# Patient Record
Sex: FEMALE | Race: WHITE | NOT HISPANIC OR LATINO | Employment: OTHER | URBAN - METROPOLITAN AREA
[De-identification: names, ages, dates, MRNs, and addresses within clinical notes are randomized per-mention and may not be internally consistent; named-entity substitution may affect disease eponyms.]

---

## 2023-07-01 ENCOUNTER — APPOINTMENT (EMERGENCY)
Dept: CT IMAGING | Facility: HOSPITAL | Age: 53
End: 2023-07-01
Payer: COMMERCIAL

## 2023-07-01 ENCOUNTER — HOSPITAL ENCOUNTER (EMERGENCY)
Facility: HOSPITAL | Age: 53
Discharge: HOME | End: 2023-07-01
Attending: EMERGENCY MEDICINE
Payer: COMMERCIAL

## 2023-07-01 VITALS
HEART RATE: 57 BPM | SYSTOLIC BLOOD PRESSURE: 157 MMHG | BODY MASS INDEX: 26.12 KG/M2 | WEIGHT: 153 LBS | TEMPERATURE: 97.6 F | HEIGHT: 64 IN | DIASTOLIC BLOOD PRESSURE: 100 MMHG | RESPIRATION RATE: 18 BRPM | OXYGEN SATURATION: 97 %

## 2023-07-01 DIAGNOSIS — R42 VERTIGO: Primary | ICD-10-CM

## 2023-07-01 DIAGNOSIS — I67.1 RIGHT INTERNAL CAROTID ARTERY ANEURYSM: ICD-10-CM

## 2023-07-01 LAB
ANION GAP SERPL CALCULATED.3IONS-SCNC: 8 MMOL/L
BASOPHILS # BLD AUTO: 0.05 THOUSANDS/ÂΜL (ref 0–0.1)
BASOPHILS NFR BLD AUTO: 1 % (ref 0–1)
BUN SERPL-MCNC: 12 MG/DL (ref 5–25)
CALCIUM SERPL-MCNC: 8.9 MG/DL (ref 8.4–10.2)
CHLORIDE SERPL-SCNC: 102 MMOL/L (ref 96–108)
CO2 SERPL-SCNC: 27 MMOL/L (ref 21–32)
CREAT SERPL-MCNC: 0.67 MG/DL (ref 0.6–1.3)
EOSINOPHIL # BLD AUTO: 0.05 THOUSAND/ÂΜL (ref 0–0.61)
EOSINOPHIL NFR BLD AUTO: 1 % (ref 0–6)
ERYTHROCYTE [DISTWIDTH] IN BLOOD BY AUTOMATED COUNT: 14.1 % (ref 11.6–15.1)
GFR SERPL CREATININE-BSD FRML MDRD: 99 ML/MIN/1.73SQ M
GLUCOSE SERPL-MCNC: 111 MG/DL (ref 65–140)
HCT VFR BLD AUTO: 40.1 % (ref 34.8–46.1)
HGB BLD-MCNC: 13 G/DL (ref 11.5–15.4)
IMM GRANULOCYTES # BLD AUTO: 0.01 THOUSAND/UL (ref 0–0.2)
IMM GRANULOCYTES NFR BLD AUTO: 0 % (ref 0–2)
LYMPHOCYTES # BLD AUTO: 1.63 THOUSANDS/ÂΜL (ref 0.6–4.47)
LYMPHOCYTES NFR BLD AUTO: 20 % (ref 14–44)
MCH RBC QN AUTO: 28.8 PG (ref 26.8–34.3)
MCHC RBC AUTO-ENTMCNC: 32.4 G/DL (ref 31.4–37.4)
MCV RBC AUTO: 89 FL (ref 82–98)
MONOCYTES # BLD AUTO: 0.39 THOUSAND/ÂΜL (ref 0.17–1.22)
MONOCYTES NFR BLD AUTO: 5 % (ref 4–12)
NEUTROPHILS # BLD AUTO: 5.87 THOUSANDS/ÂΜL (ref 1.85–7.62)
NEUTS SEG NFR BLD AUTO: 73 % (ref 43–75)
NRBC BLD AUTO-RTO: 0 /100 WBCS
PLATELET # BLD AUTO: 325 THOUSANDS/UL (ref 149–390)
PMV BLD AUTO: 9.8 FL (ref 8.9–12.7)
POTASSIUM SERPL-SCNC: 3.7 MMOL/L (ref 3.5–5.3)
RBC # BLD AUTO: 4.51 MILLION/UL (ref 3.81–5.12)
SODIUM SERPL-SCNC: 137 MMOL/L (ref 135–147)
WBC # BLD AUTO: 8 THOUSAND/UL (ref 4.31–10.16)

## 2023-07-01 PROCEDURE — 85025 COMPLETE CBC W/AUTO DIFF WBC: CPT | Performed by: EMERGENCY MEDICINE

## 2023-07-01 PROCEDURE — G1004 CDSM NDSC: HCPCS

## 2023-07-01 PROCEDURE — 70496 CT ANGIOGRAPHY HEAD: CPT

## 2023-07-01 PROCEDURE — 36415 COLL VENOUS BLD VENIPUNCTURE: CPT | Performed by: EMERGENCY MEDICINE

## 2023-07-01 PROCEDURE — 99284 EMERGENCY DEPT VISIT MOD MDM: CPT

## 2023-07-01 PROCEDURE — 96374 THER/PROPH/DIAG INJ IV PUSH: CPT

## 2023-07-01 PROCEDURE — 70498 CT ANGIOGRAPHY NECK: CPT

## 2023-07-01 PROCEDURE — 80048 BASIC METABOLIC PNL TOTAL CA: CPT | Performed by: EMERGENCY MEDICINE

## 2023-07-01 RX ORDER — MECLIZINE HCL 12.5 MG/1
25 TABLET ORAL ONCE
Status: COMPLETED | OUTPATIENT
Start: 2023-07-01 | End: 2023-07-01

## 2023-07-01 RX ORDER — ONDANSETRON 4 MG/1
4 TABLET, ORALLY DISINTEGRATING ORAL EVERY 6 HOURS PRN
Qty: 20 TABLET | Refills: 0 | Status: SHIPPED | OUTPATIENT
Start: 2023-07-01

## 2023-07-01 RX ORDER — DIAZEPAM 5 MG/ML
2.5 INJECTION, SOLUTION INTRAMUSCULAR; INTRAVENOUS ONCE
Status: COMPLETED | OUTPATIENT
Start: 2023-07-01 | End: 2023-07-01

## 2023-07-01 RX ORDER — PREDNISONE 20 MG/1
40 TABLET ORAL DAILY
Qty: 8 TABLET | Refills: 0 | Status: SHIPPED | OUTPATIENT
Start: 2023-07-02 | End: 2023-07-01

## 2023-07-01 RX ORDER — MECLIZINE HYDROCHLORIDE 25 MG/1
25 TABLET ORAL 3 TIMES DAILY PRN
Qty: 30 TABLET | Refills: 0 | Status: SHIPPED | OUTPATIENT
Start: 2023-07-01

## 2023-07-01 RX ADMIN — MECLIZINE 25 MG: 12.5 TABLET ORAL at 12:31

## 2023-07-01 RX ADMIN — IOHEXOL 85 ML: 350 INJECTION, SOLUTION INTRAVENOUS at 12:55

## 2023-07-01 RX ADMIN — DIAZEPAM 2.5 MG: 5 INJECTION INTRAMUSCULAR; INTRAVENOUS at 13:23

## 2023-07-01 NOTE — ED PROVIDER NOTES
History  Chief Complaint   Patient presents with   • Dizziness     Pt reports nausea and dizziness since 0400. Pts daughter reports labile pressures. Pt not currently on BP meds     Patient is a 58-year-old female with remote history of breast cancer, hypothyroidism that presents for dizziness. Patient says since 4 AM she has been having some room spinning dizziness. She says that it is only when she is standing or when she abruptly moves her head. Blind in the bed, she has [place] no dizziness this time. She also has some nausea without vomiting secondary to the dizziness. She is never had symptoms like this before in the past.  She denies headache, recent head strike, altered mental status or focal neurologic deficit. She denies chest pain dyspnea abdominal pain associate with her symptoms. None       Past Medical History:   Diagnosis Date   • Breast CA ([address])    • Hypothyroidism        History reviewed. No pertinent surgical history. History reviewed. No pertinent family history. I have reviewed and agree with the history as documented. E-Cigarette/Vaping     E-Cigarette/Vaping Substances     Social History     Tobacco Use   • Smoking status: Never   • Smokeless tobacco: Never   Substance Use Topics   • Alcohol use: Never   • Drug use: Never       Review of Systems   Constitutional: Negative for fever. HENT: Negative for sore throat. Respiratory: Negative for shortness of breath. Cardiovascular: Negative for chest pain. Gastrointestinal: Positive for nausea. Negative for abdominal pain. Genitourinary: Negative for dysuria. Musculoskeletal: Negative for back pain. Skin: Negative for rash. Neurological: Positive for dizziness. Negative for light-headedness. Psychiatric/Behavioral: Negative for agitation. All other systems reviewed and are negative. Physical Exam  Physical Exam  Vitals reviewed. Constitutional:       General: She is not in acute distress. Appearance: She is well-developed. HENT:      Head: Normocephalic. Eyes:      Pupils: Pupils are equal, round, and reactive to light. Comments: No significant nystagmus bilaterally. Test of skew reassuring   Cardiovascular:      Rate and Rhythm: Normal rate and regular rhythm. Heart sounds: Normal heart sounds. Pulmonary:      Effort: Pulmonary effort is normal.      Breath sounds: Normal breath sounds. Abdominal:      General: Bowel sounds are normal. There is no distension. Palpations: Abdomen is soft. Tenderness: There is no abdominal tenderness. There is no guarding. Musculoskeletal:         General: No tenderness or deformity. Normal range of motion. Cervical back: Normal range of motion and neck supple. Skin:     General: Skin is warm and dry. Capillary Refill: Capillary refill takes less than 2 seconds. Neurological:      Mental Status: She is alert and oriented to person, place, and time. Cranial Nerves: No cranial nerve deficit. Sensory: No sensory deficit. Comments: Alert oriented x3. GCS 15. Cranial nerves 2-12 intact. Strength 5/5 in all 4 extremities. Sensation intact throughout. Finger-nose normal bilaterally. Non-ataxic gait. Psychiatric:         Behavior: Behavior normal.         Thought Content:  Thought content normal.         Judgment: Judgment normal.         Vital Signs  ED Triage Vitals   Temperature Pulse Respirations Blood Pressure SpO2   07/01/23 1137 07/01/23 1131 07/01/23 1131 07/01/23 1131 07/01/23 1131   97.6 °F (36.4 °C) 60 16 (!) 182/96 97 %      Temp Source Heart Rate Source Patient Position - Orthostatic VS BP Location FiO2 (%)   07/01/23 1137 07/01/23 1131 07/01/23 1131 07/01/23 1131 --   Oral Monitor Lying Left arm       Pain Score       07/01/23 1131       No Pain           Vitals:    07/01/23 1200 07/01/23 1230 07/01/23 1330 07/01/23 1400   BP: 154/96 (!) 172/102 (!) 156/102 157/100   Pulse: (!) 54 59 60 57 Patient Position - Orthostatic VS:             Visual Acuity  Visual Acuity    Flowsheet Row Most Recent Value   L Pupil Size (mm) 5   R Pupil Size (mm) 4          ED Medications  Medications   meclizine (ANTIVERT) tablet 25 mg (25 mg Oral Given 7/1/23 1231)   iohexol (OMNIPAQUE) 350 MG/ML injection (SINGLE-DOSE) 85 mL (85 mL Intravenous Given 7/1/23 1255)   diazepam (VALIUM) injection 2.5 mg (2.5 mg Intravenous Given 7/1/23 1323)       Diagnostic Studies  Results Reviewed     Procedure Component Value Units Date/Time    Basic metabolic panel [[id number]] Collected: 07/01/23 1211    Lab Status: Final result Specimen: Blood from Arm, Left Updated: 07/01/23 1233     Sodium 137 mmol/L      Potassium 3.7 mmol/L      Chloride 102 mmol/L      CO2 27 mmol/L      ANION GAP 8 mmol/L      BUN 12 mg/dL      Creatinine 0.67 mg/dL      Glucose 111 mg/dL      Calcium 8.9 mg/dL      eGFR 99 ml/min/1.73sq m     Narrative:      [place] guidelines for Chronic Kidney Disease (CKD):   •  Stage 1 with normal or high GFR (GFR > 90 mL/min/1.73 square meters)  •  Stage 2 Mild CKD (GFR = 60-89 mL/min/1.73 square meters)  •  Stage 3A Moderate CKD (GFR = 45-59 mL/min/1.73 square meters)  •  Stage 3B Moderate CKD (GFR = 30-44 mL/min/1.73 square meters)  •  Stage 4 Severe CKD (GFR = 15-29 mL/min/1.73 square meters)  •  Stage 5 End Stage CKD (GFR <15 mL/min/1.73 square meters)  Note: GFR calculation is accurate only with a steady state creatinine    CBC and differential [[id number]] Collected: 07/01/23 1211    Lab Status: Final result Specimen: Blood from Arm, Left Updated: 07/01/23 1223     WBC 8.00 Thousand/uL      RBC 4.51 Million/uL      Hemoglobin 13.0 g/dL      Hematocrit 40.1 %      MCV 89 fL      MCH 28.8 pg      MCHC 32.4 g/dL      RDW 14.1 %      MPV 9.8 fL      Platelets 894 Thousands/uL      nRBC 0 /100 WBCs      Neutrophils Relative 73 %      Immat GRANS % 0 %      Lymphocytes Relative 20 %      Monocytes Relative 5 %      Eosinophils Relative 1 %      Basophils Relative 1 %      Neutrophils Absolute 5.87 Thousands/µL      Immature Grans Absolute 0.01 Thousand/uL      Lymphocytes Absolute 1.63 Thousands/µL      Monocytes Absolute 0.39 Thousand/µL      Eosinophils Absolute 0.05 Thousand/µL      Basophils Absolute 0.05 Thousands/µL                  CTA head and neck with and without contrast   Final Result by YADIRA Hernandez MD (07/01 1404)      No acute intracranial pathology. No significant stenosis of the cervical carotid or vertebral arteries. No significant intracranial stenosis or large vessel occlusion. 4 mm right paraophthalmic ICA aneurysm. Follow-up with neurovascular service. The study was marked in [facility] for immediate notification. Workstation performed: [id number]                    Procedures  Procedures         ED Course                               SBIRT 22yo+    Flowsheet Row Most Recent Value   Initial Alcohol Screen: US AUDIT-C     1. How often do you have a drink containing alcohol? 0 Filed at: 07/01/2023 1136   2. How many drinks containing alcohol do you have on a typical day you are drinking? 0 Filed at: 07/01/2023 1136   3a. Male UNDER 65: How often do you have five or more drinks on one occasion? 0 Filed at: 07/01/2023 1136   3b. FEMALE Any Age, or MALE 65+: How often do you have 4 or more drinks on one occassion? 0 Filed at: 07/01/2023 1136   Audit-C Score 0 Filed at: 07/01/2023 1136   SERGEY: How many times in the past year have you. .. Used an illegal drug or used a prescription medication for non-medical reasons? Never Filed at: 07/01/2023 1136                    Medical Decision Making  Patient is a 23-year-old female who presents for evaluation of room spinning dizziness. Patient's vertigo seems to be secondary to peripheral etiology, she also notes some tenderness last night.   The fact that it is intermittent and positional in nature seem to indicate that it is peripheral.  However, CT head and neck were obtained and found to be negative. She was found to have a right ICA aneurysm which is incidental, discussed with endovascular and plan for outpatient follow-up. Blood work also reviewed and negative. Complete resolution of symptoms after meclizine and small dose of Valium. Strict return precautions discussed and outpatient management agreed upon. Patient's daughter helps with history. Amount and/or Complexity of Data Reviewed  Labs: ordered. Radiology: ordered. Risk  Prescription drug management. Disposition  Final diagnoses:   Vertigo   Right internal carotid artery aneurysm     Time reflects when diagnosis was documented in both MDM as applicable and the Disposition within this note     Time User Action Codes Description Comment    7/1/2023 12:16 PM Ree Janie Add [R21] Rash     7/1/2023 12:24 PM Shugars, Daniel Schaumann Remove [R21] Rash     7/1/2023  2:30 PM Yvetta Real, Daniel Schaumann Add [R42] Vertigo     7/1/2023  2:31 PM Yvetta Real, Daniel Schaumann Add [I67.1] Right internal carotid artery aneurysm       ED Disposition     ED Disposition   Discharge    Condition   Stable    Date/Time   Sat Jul 1, 2023  2:30 PM    [facility] [address] discharge to home/self care.                Follow-up Information     Follow up With Specialties Details Why Contact Info Additional [address] Emergency Department Emergency Medicine  If symptoms worsen [address] [place] [address]  [address] [place] [address] Emergency Department, [facility], [address]    John Tinajero MD Neurosurgery Schedule an appointment as soon as possible for a visit   [address] [place] [address] [address] [place] [address] [phone]             Discharge Medication List as of 7/1/2023  2:33 PM      START taking these medications    Details   meclizine (ANTIVERT) 25 mg tablet Take 1 tablet (25 mg total) by mouth 3 (three) times a day as needed for dizziness, Starting Sat 7/1/2023, Normal      ondansetron (Zofran ODT) 4 mg disintegrating tablet Take 1 tablet (4 mg total) by mouth every 6 (six) hours as needed for nausea or vomiting, Starting Sat 7/1/2023, Normal                 PDMP Review     None          ED Provider  Electronically Signed by           Spencer Armenta MD  07/01/23 9628

## 2023-07-01 NOTE — DISCHARGE INSTRUCTIONS
-As discussed, please use the meclizine for dizziness and Zofran for nausea over the next several days. Follow-up with ENT for further work-up of peripheral vertigo and follow-up with neurosurgery regarding incidentally found ICA aneurysm.

## 2023-09-08 PROBLEM — I67.1 INTRACRANIAL ANEURYSM: Status: ACTIVE | Noted: 2023-09-08

## 2023-09-08 NOTE — ASSESSMENT & PLAN NOTE
New evaluation of a 4mm right paraophthalmic artery aneurysm   · Noted on work up for vertigo/tinnitus 7/2023. This has resolved. · Mother passed away from 'bleeding in her head' while in the [place] in 2005. Non-smoker. BP controlled. · Exam: non-focal     Imaging:  · CTA head/neck 7/1/23: No acute intracranial pathology. No significant stenosis of the cervical carotid or vertebral arteries. No significant intracranial stenosis or large vessel occlusion. 4 mm right paraophthalmic ICA aneurysm. Follow-up with neurovascular service    Plan:  Reviewed images with patient, daughter and Dr. Vidhya Martins. 4mm right paraophthalmic artery aneurysm seen. She does have a concerning family history of possible aneurysm rupture in her mother who passed at a young age. While her risk of rupture is still low, it is likely slightly higher due to her family history. Recommend diagnostic cerebral angiogram for further evaluation of her aneurysm. Risks/benefits discussed by Dr. Vidhya Martins. Discussed natural history of aneurysms and risk of rupture. Discussed genetic component to aneurysms as well as modifiable risk factors such as smoking and HTN. Discussed screening of her siblings as well as all of their children with MRA head. If negative, can be re-screened every 5 years. Reviewed red flag s/s. She would like to discuss the procedure with her family and will call us back in the next several days to set this up. Call sooner with any questions or concerns.

## 2023-09-11 ENCOUNTER — OFFICE VISIT (OUTPATIENT)
Dept: NEUROSURGERY | Facility: CLINIC | Age: 53
End: 2023-09-11
Payer: COMMERCIAL

## 2023-09-11 VITALS
HEART RATE: 66 BPM | RESPIRATION RATE: 18 BRPM | HEIGHT: 64 IN | SYSTOLIC BLOOD PRESSURE: 128 MMHG | WEIGHT: 155.8 LBS | DIASTOLIC BLOOD PRESSURE: 68 MMHG | BODY MASS INDEX: 26.6 KG/M2 | TEMPERATURE: 99.4 F | OXYGEN SATURATION: 98 %

## 2023-09-11 DIAGNOSIS — I67.1 RIGHT INTERNAL CAROTID ARTERY ANEURYSM: ICD-10-CM

## 2023-09-11 PROCEDURE — 99244 OFF/OP CNSLTJ NEW/EST MOD 40: CPT | Performed by: RADIOLOGY

## 2023-09-11 RX ORDER — LEVOTHYROXINE SODIUM 88 UG/1
TABLET ORAL
COMMUNITY
Start: 2023-09-01

## 2023-09-11 RX ORDER — LETROZOLE 2.5 MG/1
2.5 TABLET, FILM COATED ORAL DAILY
COMMUNITY
Start: 2023-06-20 | End: 2024-06-19

## 2023-09-11 NOTE — PROGRESS NOTES
Neurosurgery Office Note  Nereida Hernandez 46 y.o. female MRN: [id number]      Assessment/Plan     Intracranial aneurysm  New evaluation of a 4mm right paraophthalmic artery aneurysm   · Noted on work up for vertigo/tinnitus 7/2023. This has resolved. · Mother passed away from 'bleeding in her head' while in the [place] in 2005. Non-smoker. BP controlled. · Exam: non-focal     Imaging:  · CTA head/neck 7/1/23: No acute intracranial pathology. No significant stenosis of the cervical carotid or vertebral arteries. No significant intracranial stenosis or large vessel occlusion. 4 mm right paraophthalmic ICA aneurysm. Follow-up with neurovascular service    Plan:  Reviewed images with patient, daughter and Dr. Tejas Pinzon. 4mm right paraophthalmic artery aneurysm seen. She does have a concerning family history of possible aneurysm rupture in her mother who passed at a young age. While her risk of rupture is still low, it is likely slightly higher due to her family history. Recommend diagnostic cerebral angiogram for further evaluation of her aneurysm. Risks/benefits discussed by Dr. Tejas Pinzon. Discussed natural history of aneurysms and risk of rupture. Discussed genetic component to aneurysms as well as modifiable risk factors such as smoking and HTN. Discussed screening of her siblings as well as all of their children with MRA head. If negative, can be re-screened every 5 years. Reviewed red flag s/s. She would like to discuss the procedure with her family and will call us back in the next several days to set this up. Call sooner with any questions or concerns. Diagnoses and all orders for this visit:    Right internal carotid artery aneurysm  -     Ambulatory Referral to Neurosurgery    Other orders  -     levothyroxine 88 mcg tablet  -     letrozole (FEMARA) 2.5 mg tablet;  Take 2.5 mg by mouth daily          I have spent a total time of 40 minutes on 09/11/23 in caring for this patient including Diagnostic results, Risks and benefits of tx options, Instructions for management, Patient and family education, Risk factor reductions, Impressions, Counseling / Coordination of care, Documenting in the medical record, Reviewing / ordering tests, medicine, procedures  , Obtaining or reviewing history   and Communicating with other healthcare professionals . CHIEF COMPLAINT    Chief Complaint   Patient presents with   • Follow-up       HISTORY    History of Present Illness     46y.o. year old female     48-year-old female seen for new evaluation of a 4 mm right paraophthalmic artery aneurysm incidentally noted on evaluation of vertigo 7/2023. She developed an acute episode of vertigo and was unable to stand on her own. She also had associated tinnitus bilaterally, numbness in her hands, nausea. Her symptoms have resolved except for bilateral ear tinnitus, right worse than left. Due to see ENT later this month. Notes occasional frontal headache, no blurry or double vision, though daughter who is with her states that she had a single episode of blurry vision in both eyes that was self-limiting. No difficulty speaking/trouble getting words out, numbness or weakness in her extremities, no gait or balance issues, BBI. Patient states that her mother passed away from bleeding in her brain and a stroke at a young age in 2005 they were still living in the [address] [place] [address] what was really the cause. Patient is a non-smoker, BP controlled. See Discussion    REVIEW OF SYSTEMS    Review of Systems   Constitutional: Negative. HENT: Positive for tinnitus (right ear wooshing sound has appointment with ent). Eyes: Positive for visual disturbance (at times with headache ). Respiratory: Negative. Cardiovascular: Negative. Gastrointestinal: Negative. Endocrine: Negative. Genitourinary: Negative. Musculoskeletal: Negative. Skin: Negative. Allergic/Immunologic: Negative. Neurological: Positive for headaches (at times). Negative for dizziness, seizures, speech difficulty, weakness and numbness. Hematological: Does not bruise/bleed easily. Psychiatric/Behavioral: Negative for sleep disturbance. ROS obtained by MA. Reviewed. See HPI. Meds/Allergies     Current Outpatient Medications   Medication Sig Dispense Refill   • letrozole [facility]) 2.5 mg tablet Take 2.5 mg by mouth daily     • levothyroxine 88 mcg tablet      • meclizine (ANTIVERT) 25 mg tablet Take 1 tablet (25 mg total) by mouth 3 (three) times a day as needed for dizziness (Patient not taking: Reported on 9/11/2023) 30 tablet 0   • ondansetron (Zofran ODT) 4 mg disintegrating tablet Take 1 tablet (4 mg total) by mouth every 6 (six) hours as needed for nausea or vomiting (Patient not taking: Reported on 9/11/2023) 20 tablet 0     No current facility-administered medications for this visit. No Known Allergies    PAST HISTORY    Past Medical History:   Diagnosis Date   • Breast CA ([address])    • Hypothyroidism    • Intracranial aneurysm 9/8/2023       History reviewed. No pertinent surgical history. Social History     Tobacco Use   • Smoking status: Never   • Smokeless tobacco: Never   Substance Use Topics   • Alcohol use: Never   • Drug use: Never       History reviewed. No pertinent family history. Above history personally reviewed. EXAM    Vitals:Blood pressure 128/68, pulse 66, temperature 99.4 °F (37.4 °C), temperature source Temporal, resp. rate 18, height 5' 4" (1.626 m), weight 70.7 kg (155 lb 12.8 oz), SpO2 98 %. ,Body mass index is 26.74 kg/m². Physical Exam  Vitals reviewed. Constitutional:       General: She is awake. Appearance: Normal appearance. HENT:      Head: Normocephalic and atraumatic. Eyes:      Extraocular Movements: EOM normal.      Conjunctiva/sclera: Conjunctivae normal.      Pupils: Pupils are equal, round, and reactive to light.    Cardiovascular:      Rate and Rhythm: Normal rate. Pulmonary:      Effort: Pulmonary effort is normal.   Skin:     General: Skin is warm and dry. Neurological:      Mental Status: She is alert and oriented to person, place, and time. Motor: Motor strength is normal.     Coordination: Finger-Nose-Finger Test normal.      Gait: Gait is intact. Deep Tendon Reflexes:      Reflex Scores:       Bicep reflexes are 2+ on the right side and 2+ on the left side. Brachioradialis reflexes are 2+ on the right side and 2+ on the left side. Patellar reflexes are 2+ on the right side and 2+ on the left side. Achilles reflexes are 2+ on the right side and 2+ on the left side. Psychiatric:         Attention and Perception: Attention and perception normal.         Mood and Affect: Mood and affect normal.         Speech: Speech normal.         Behavior: Behavior normal. Behavior is cooperative. Thought Content: Thought content normal.         Cognition and Memory: Cognition and memory normal.         Judgment: Judgment normal.         Neurologic Exam     Mental Status   Oriented to person, place, and time. Follows 2 step commands. Attention: normal. Concentration: normal.   Speech: speech is normal   Level of consciousness: alert  Knowledge: good. Able to perform simple calculations. Able to name object. Normal comprehension. Cranial Nerves     CN III, IV, VI   Pupils are equal, round, and reactive to light. Extraocular motions are normal.   Right pupil: Shape: regular. Reactivity: brisk. Consensual response: intact. Left pupil: Shape: regular. Reactivity: brisk. Consensual response: intact. CN III: no CN III palsy  CN VI: no CN VI palsy  Nystagmus: none   Ophthalmoparesis: none  Upgaze: normal  Downgaze: normal  Conjugate gaze: present    CN V   Facial sensation intact. CN VII   Facial expression full, symmetric.      CN VIII   Hearing: intact    CN XI   Right trapezius strength: normal  Left trapezius strength: normal    CN XII   CN XII normal.     Motor Exam   Muscle bulk: normal  Overall muscle tone: normal  Right arm pronator drift: absent  Left arm pronator drift: absent    Strength   Strength 5/5 throughout. Sensory Exam   Light touch normal.     Gait, Coordination, and Reflexes     Gait  Gait: normal    Coordination   Finger to nose coordination: normal    Tremor   Resting tremor: absent  Intention tremor: absent  Action tremor: absent    Reflexes   Right brachioradialis: 2+  Left brachioradialis: 2+  Right biceps: 2+  Left biceps: 2+  Right patellar: 2+  Left patellar: 2+  Right achilles: 2+  Left achilles: 2+  Right Christianson: absent  Left Christianson: absent  Right ankle clonus: absent  Left ankle clonus: absent        MEDICAL DECISION MAKING    Imaging Studies:     No results found. I have personally reviewed pertinent reports.    and I have personally reviewed pertinent films in PACS

## 2024-06-03 ENCOUNTER — APPOINTMENT (OUTPATIENT)
Dept: LAB | Facility: CLINIC | Age: 54
End: 2024-06-03
Payer: COMMERCIAL

## 2024-06-03 DIAGNOSIS — E03.8 TOXIC DIFFUSE GOITER WITH PRETIBIAL MYXEDEMA: ICD-10-CM

## 2024-06-03 DIAGNOSIS — E78.00 PURE HYPERCHOLESTEROLEMIA: ICD-10-CM

## 2024-06-03 DIAGNOSIS — E55.9 AVITAMINOSIS D: ICD-10-CM

## 2024-06-03 DIAGNOSIS — E53.8 BIOTIN-(PROPIONYL-COA-CARBOXYLASE) LIGASE DEFICIENCY: ICD-10-CM

## 2024-06-03 DIAGNOSIS — R73.03 DIABETES MELLITUS, LATENT: ICD-10-CM

## 2024-06-03 DIAGNOSIS — Z11.59 SCREENING EXAMINATION FOR POLIOMYELITIS: ICD-10-CM

## 2024-06-03 DIAGNOSIS — E05.00 TOXIC DIFFUSE GOITER WITH PRETIBIAL MYXEDEMA: ICD-10-CM

## 2024-06-03 DIAGNOSIS — E06.3 CHRONIC LYMPHOCYTIC THYROIDITIS: ICD-10-CM

## 2024-06-03 LAB
25(OH)D3 SERPL-MCNC: 19.1 NG/ML (ref 30–100)
ALBUMIN SERPL BCP-MCNC: 4.5 G/DL (ref 3.5–5)
ALP SERPL-CCNC: 60 U/L (ref 34–104)
ALT SERPL W P-5'-P-CCNC: 11 U/L (ref 7–52)
ANION GAP SERPL CALCULATED.3IONS-SCNC: 10 MMOL/L (ref 4–13)
AST SERPL W P-5'-P-CCNC: 19 U/L (ref 13–39)
BILIRUB SERPL-MCNC: 0.48 MG/DL (ref 0.2–1)
BUN SERPL-MCNC: 16 MG/DL (ref 5–25)
CALCIUM SERPL-MCNC: 9.3 MG/DL (ref 8.4–10.2)
CHLORIDE SERPL-SCNC: 103 MMOL/L (ref 96–108)
CHOLEST SERPL-MCNC: 201 MG/DL
CO2 SERPL-SCNC: 26 MMOL/L (ref 21–32)
CREAT SERPL-MCNC: 0.72 MG/DL (ref 0.6–1.3)
EST. AVERAGE GLUCOSE BLD GHB EST-MCNC: 117 MG/DL
GFR SERPL CREATININE-BSD FRML MDRD: 94 ML/MIN/1.73SQ M
GLUCOSE P FAST SERPL-MCNC: 83 MG/DL (ref 65–99)
HBA1C MFR BLD: 5.7 %
HDLC SERPL-MCNC: 57 MG/DL
LDLC SERPL CALC-MCNC: 123 MG/DL (ref 0–100)
NONHDLC SERPL-MCNC: 144 MG/DL
POTASSIUM SERPL-SCNC: 4.1 MMOL/L (ref 3.5–5.3)
PROT SERPL-MCNC: 8.1 G/DL (ref 6.4–8.4)
SODIUM SERPL-SCNC: 139 MMOL/L (ref 135–147)
TRIGL SERPL-MCNC: 105 MG/DL
TSH SERPL DL<=0.05 MIU/L-ACNC: 24.93 UIU/ML (ref 0.45–4.5)
VIT B12 SERPL-MCNC: 142 PG/ML (ref 180–914)

## 2024-06-03 PROCEDURE — 83036 HEMOGLOBIN GLYCOSYLATED A1C: CPT

## 2024-06-03 PROCEDURE — 87340 HEPATITIS B SURFACE AG IA: CPT

## 2024-06-03 PROCEDURE — 80061 LIPID PANEL: CPT

## 2024-06-03 PROCEDURE — 36415 COLL VENOUS BLD VENIPUNCTURE: CPT

## 2024-06-03 PROCEDURE — 82607 VITAMIN B-12: CPT

## 2024-06-03 PROCEDURE — 86704 HEP B CORE ANTIBODY TOTAL: CPT

## 2024-06-03 PROCEDURE — 82306 VITAMIN D 25 HYDROXY: CPT

## 2024-06-03 PROCEDURE — 84443 ASSAY THYROID STIM HORMONE: CPT

## 2024-06-03 PROCEDURE — 80053 COMPREHEN METABOLIC PANEL: CPT

## 2024-06-03 PROCEDURE — 86706 HEP B SURFACE ANTIBODY: CPT

## 2024-06-04 LAB
HBV CORE AB SER QL: REACTIVE
HBV SURFACE AB SER-ACNC: 79.3 MIU/ML
HBV SURFACE AG SER QL: NORMAL

## 2024-07-03 ENCOUNTER — OFFICE VISIT (OUTPATIENT)
Dept: INTERNAL MEDICINE CLINIC | Facility: CLINIC | Age: 54
End: 2024-07-03
Payer: COMMERCIAL

## 2024-07-03 VITALS
HEIGHT: 64 IN | DIASTOLIC BLOOD PRESSURE: 70 MMHG | SYSTOLIC BLOOD PRESSURE: 122 MMHG | HEART RATE: 68 BPM | TEMPERATURE: 98.6 F | OXYGEN SATURATION: 98 % | WEIGHT: 162.4 LBS | BODY MASS INDEX: 27.72 KG/M2

## 2024-07-03 DIAGNOSIS — R42 VERTIGO: ICD-10-CM

## 2024-07-03 DIAGNOSIS — Z13.220 SCREENING FOR HYPERCHOLESTEROLEMIA: ICD-10-CM

## 2024-07-03 DIAGNOSIS — I10 PRIMARY HYPERTENSION: ICD-10-CM

## 2024-07-03 DIAGNOSIS — C50.912 MALIGNANT NEOPLASM OF LEFT FEMALE BREAST, UNSPECIFIED ESTROGEN RECEPTOR STATUS, UNSPECIFIED SITE OF BREAST (HCC): ICD-10-CM

## 2024-07-03 DIAGNOSIS — Z79.811 AROMATASE INHIBITOR USE: ICD-10-CM

## 2024-07-03 DIAGNOSIS — Z12.4 SCREENING FOR CERVICAL CANCER: ICD-10-CM

## 2024-07-03 DIAGNOSIS — Z11.59 NEED FOR HEPATITIS C SCREENING TEST: ICD-10-CM

## 2024-07-03 DIAGNOSIS — Z13.0 SCREENING, ANEMIA, DEFICIENCY, IRON: ICD-10-CM

## 2024-07-03 DIAGNOSIS — Z13.1 SCREENING FOR DIABETES MELLITUS: ICD-10-CM

## 2024-07-03 DIAGNOSIS — H81.10 BENIGN PAROXYSMAL POSITIONAL VERTIGO, UNSPECIFIED LATERALITY: ICD-10-CM

## 2024-07-03 DIAGNOSIS — E03.8 HYPOTHYROIDISM DUE TO HASHIMOTO'S THYROIDITIS: ICD-10-CM

## 2024-07-03 DIAGNOSIS — Z12.11 SCREENING FOR COLON CANCER: ICD-10-CM

## 2024-07-03 DIAGNOSIS — Z13.820 SCREENING FOR OSTEOPOROSIS: ICD-10-CM

## 2024-07-03 DIAGNOSIS — Z12.31 ENCOUNTER FOR SCREENING MAMMOGRAM FOR BREAST CANCER: ICD-10-CM

## 2024-07-03 DIAGNOSIS — Z11.4 SCREENING FOR HIV (HUMAN IMMUNODEFICIENCY VIRUS): ICD-10-CM

## 2024-07-03 DIAGNOSIS — E06.3 HYPOTHYROIDISM DUE TO HASHIMOTO'S THYROIDITIS: ICD-10-CM

## 2024-07-03 DIAGNOSIS — Z23 ENCOUNTER FOR IMMUNIZATION: Primary | ICD-10-CM

## 2024-07-03 PROBLEM — K59.04 CHRONIC IDIOPATHIC CONSTIPATION: Status: ACTIVE | Noted: 2017-05-23

## 2024-07-03 PROBLEM — I67.1 ANEURYSM OF RIGHT INTERNAL CAROTID ARTERY: Status: ACTIVE | Noted: 2023-07-26

## 2024-07-03 PROBLEM — R73.03 PREDIABETES: Status: ACTIVE | Noted: 2022-04-29

## 2024-07-03 PROBLEM — E78.00 ELEVATED LOW DENSITY LIPOPROTEIN (LDL) CHOLESTEROL LEVEL: Status: ACTIVE | Noted: 2022-08-01

## 2024-07-03 PROBLEM — E55.9 VITAMIN D DEFICIENCY: Status: ACTIVE | Noted: 2024-05-29

## 2024-07-03 PROCEDURE — 99205 OFFICE O/P NEW HI 60 MIN: CPT | Performed by: INTERNAL MEDICINE

## 2024-07-03 RX ORDER — LEVOTHYROXINE SODIUM 0.1 MG/1
100 TABLET ORAL DAILY
Qty: 30 TABLET | Refills: 1 | Status: SHIPPED | OUTPATIENT
Start: 2024-07-03 | End: 2024-09-01

## 2024-07-03 RX ORDER — MECLIZINE HYDROCHLORIDE 25 MG/1
25 TABLET ORAL 3 TIMES DAILY PRN
Qty: 30 TABLET | Refills: 0 | Status: SHIPPED | OUTPATIENT
Start: 2024-07-03

## 2024-07-03 RX ORDER — LEVOTHYROXINE SODIUM 0.1 MG/1
100 TABLET ORAL DAILY
COMMUNITY
Start: 2024-06-11 | End: 2024-07-03 | Stop reason: SDUPTHER

## 2024-07-03 RX ORDER — LETROZOLE 2.5 MG/1
2.5 TABLET, FILM COATED ORAL DAILY
Qty: 30 TABLET | Refills: 11 | Status: SHIPPED | OUTPATIENT
Start: 2024-07-03 | End: 2025-07-03

## 2024-07-03 NOTE — PROGRESS NOTES
Ambulatory Visit  Name: Lizeth Hartman      : 1970      MRN: [id number]  Encounter Provider: Danilo Robles DO  Encounter Date: 7/3/2024   Encounter department: [facility], located within [facility]    Assessment & Plan   1. Encounter for immunization  2. Hypothyroidism due to Hashimoto's thyroiditis  Assessment & Plan:  TSH very elevated and we will restart Synthroid (Brand Only) and see if she tolerates this   Declined Amour thyroid.    TSH in 6 weeks to discuss  Orders:  -     levothyroxine 100 mcg tablet; Take 1 tablet (100 mcg total) by mouth daily (Brand only)  -     TSH, 3rd generation; Future; Expected date: 2024  3. Benign paroxysmal positional vertigo, unspecified laterality  Assessment & Plan:  Meclizine PRN  Continue to follow  No symptoms at this time  4. Aromatase inhibitor use  Assessment & Plan:  DXA scan ordered  5. Primary hypertension  Assessment & Plan:  BP is noted to be good and no change at this time  No medications for BP and we will continue to follow  6. Malignant neoplasm of left female breast, unspecified estrogen receptor status, unspecified site of breast (HCC)  -     Ambulatory Referral to Obstetrics / Gynecology; Future  -     letrozole (FEMARA) 2.5 mg tablet; Take 1 tablet (2.5 mg total) by mouth daily  7. Encounter for screening mammogram for breast cancer  -     Mammo screening bilateral w 3d & cad; Future; Expected date: 2024  8. Need for hepatitis C screening test  9. Screening for HIV (human immunodeficiency virus)  10. Screening for cervical cancer  11. Screening for colon cancer  12. Vertigo  -     meclizine (ANTIVERT) 25 mg tablet; Take 1 tablet (25 mg total) by mouth 3 (three) times a day as needed for dizziness  13. Screening for osteoporosis  -     DXA bone density spine hip and pelvis; Future; Expected date: 2024  14. Screening for hypercholesterolemia  -     Lipid Panel with Direct LDL reflex; Future; Expected date: 2024  15. Screening for  "diabetes mellitus  -     Comprehensive metabolic panel; Future; Expected date: 08/14/2024  16. Screening, anemia, deficiency, iron  -     CBC and differential; Future; Expected date: 08/14/2024       History of Present Illness     The patient was seen and examined and noted to have issues with an elevated TSH.  The patient notes that the levothyroxine caused issues with myalgia ( I noted that hypothyroidism caused similar issues).  She stopped the Levothyroxine and it was noted to be 54.  We discussed the elevated nature of the her TSH.  The patient is on Femara and has not had a DXA.  We discussed and she is amendable to this.  In addition, the patient notes that she would like a referral to a local Gyn.  The patient notes remote issues with vertigo.  She has Meclizine and that worked.  She notes the right TM is perforated.  The patient notes no other issues at this time.        Review of Systems   Constitutional:  Negative for chills, fatigue and fever.   HENT: Negative.     Respiratory:  Negative for cough, chest tightness and shortness of breath.    Cardiovascular:  Negative for chest pain, palpitations and leg swelling.   Gastrointestinal:  Negative for abdominal pain, constipation, diarrhea, nausea and vomiting.   Genitourinary: Negative.    Musculoskeletal:  Negative for arthralgias, back pain and myalgias.   Skin: Negative.    Neurological: Negative.    Psychiatric/Behavioral: Negative.         Objective     /70 (BP Location: Left arm, Patient Position: Sitting, Cuff Size: Adult)   Pulse 68   Temp 98.6 °F (37 °C) (Tympanic)   Ht 5' 4\" (1.626 m)   Wt 73.7 kg (162 lb 6.4 oz)   SpO2 98%   BMI 27.88 kg/m²     Physical Exam  Vitals and nursing note reviewed.   Constitutional:       General: She is not in acute distress.     Appearance: She is well-developed.   HENT:      Head: Normocephalic and atraumatic.      Ears:      Comments: Right TM is perforated  Eyes:      Conjunctiva/sclera: Conjunctivae " normal.   Cardiovascular:      Rate and Rhythm: Normal rate and regular rhythm.      Heart sounds: No murmur heard.  Pulmonary:      Effort: Pulmonary effort is normal. No respiratory distress.      Breath sounds: Normal breath sounds.   Abdominal:      Palpations: Abdomen is soft.      Tenderness: There is no abdominal tenderness.   Musculoskeletal:         General: No swelling.      Cervical back: Neck supple.   Skin:     General: Skin is warm and dry.      Capillary Refill: Capillary refill takes less than 2 seconds.   Neurological:      Mental Status: She is alert.   Psychiatric:         Mood and Affect: Mood normal.       Administrative Statements

## 2024-07-03 NOTE — ASSESSMENT & PLAN NOTE
TSH very elevated and we will restart Synthroid (Brand Only) and see if she tolerates this   Declined Amour thyroid.    TSH in 6 weeks to discuss

## 2024-07-03 NOTE — ASSESSMENT & PLAN NOTE
BP is noted to be good and no change at this time  No medications for BP and we will continue to follow

## 2024-07-12 ENCOUNTER — TELEPHONE (OUTPATIENT)
Age: 54
End: 2024-07-12

## 2024-07-17 ENCOUNTER — TELEPHONE (OUTPATIENT)
Age: 54
End: 2024-07-17

## 2024-07-17 NOTE — TELEPHONE ENCOUNTER
PA for letrozole (FEMARA) 2.5 mg tablet      Submitted via    []CMM-KEY   [x]The [employer]-Case ID #   []Faxed to plan   []Other website   []Phone call Case ID #     Office notes sent, clinical questions answered. Awaiting determination    Turnaround time for your insurance to make a decision on your Prior Authorization can take 7-21 business days.

## 2024-07-18 NOTE — TELEPHONE ENCOUNTER
PA for letrozole (FEMARA) 2.5 mg tablet Approved     Date(s) approved 7/17/2024-7/17/2025    Case #    Patient advised by          [x] [employer] Message  [] Phone call   []LMOM  []L/M to call office as no active Communication consent on file  []Unable to leave detailed message as VM not approved on Communication consent       Pharmacy advised by    [x]Fax  []Phone call    Approval letter scanned into Media Yes

## 2024-07-20 DIAGNOSIS — E03.8 HYPOTHYROIDISM DUE TO HASHIMOTO'S THYROIDITIS: ICD-10-CM

## 2024-07-20 DIAGNOSIS — E06.3 HYPOTHYROIDISM DUE TO HASHIMOTO'S THYROIDITIS: ICD-10-CM

## 2024-07-20 RX ORDER — LEVOTHYROXINE SODIUM 0.1 MG/1
100 TABLET ORAL DAILY
Qty: 30 TABLET | Refills: 0 | Status: CANCELLED | OUTPATIENT
Start: 2024-07-20 | End: 2024-09-18

## 2024-07-22 ENCOUNTER — HOSPITAL ENCOUNTER (OUTPATIENT)
Dept: BONE DENSITY | Facility: HOSPITAL | Age: 54
Discharge: HOME | End: 2024-07-22
Payer: COMMERCIAL

## 2024-07-22 VITALS — BODY MASS INDEX: 27.66 KG/M2 | WEIGHT: 162 LBS | HEIGHT: 64 IN

## 2024-07-22 DIAGNOSIS — Z13.820 SCREENING FOR OSTEOPOROSIS: ICD-10-CM

## 2024-07-22 PROCEDURE — 77080 DXA BONE DENSITY AXIAL: CPT

## 2024-07-25 PROBLEM — M85.851 OSTEOPENIA OF BOTH THIGHS: Status: ACTIVE | Noted: 2024-07-25

## 2024-07-25 PROBLEM — M85.852 OSTEOPENIA OF BOTH THIGHS: Status: ACTIVE | Noted: 2024-07-25

## 2024-07-25 NOTE — RESULT ENCOUNTER NOTE
Osteopenia noted on the DXA scan.  You should engage in regular weight bearing exercise and take Calcium 600 mg with Vitamin D 200-400 IU twice a day.  We will repeat the study in 2 years

## 2024-07-31 ENCOUNTER — HOSPITAL ENCOUNTER (OUTPATIENT)
Dept: MAMMOGRAPHY | Facility: HOSPITAL | Age: 54
Discharge: HOME | End: 2024-07-31
Payer: COMMERCIAL

## 2024-07-31 DIAGNOSIS — Z12.31 ENCOUNTER FOR SCREENING MAMMOGRAM FOR BREAST CANCER: ICD-10-CM

## 2024-07-31 PROCEDURE — 77067 SCR MAMMO BI INCL CAD: CPT

## 2024-07-31 PROCEDURE — 77063 BREAST TOMOSYNTHESIS BI: CPT

## 2024-08-01 ENCOUNTER — TELEPHONE (OUTPATIENT)
Dept: INTERNAL MEDICINE CLINIC | Facility: CLINIC | Age: 54
End: 2024-08-01

## 2024-08-01 NOTE — TELEPHONE ENCOUNTER
----- Message from Danilo Robles DO sent at 8/1/2024  2:46 PM EDT -----  Mammogram was noted to be negative and follow up next year with a repeat screening mammogram   DISCHARGE

## 2024-08-15 ENCOUNTER — LAB (OUTPATIENT)
Dept: LAB | Facility: CLINIC | Age: 54
End: 2024-08-15
Payer: COMMERCIAL

## 2024-08-15 ENCOUNTER — TELEPHONE (OUTPATIENT)
Dept: INTERNAL MEDICINE CLINIC | Facility: CLINIC | Age: 54
End: 2024-08-15

## 2024-08-15 DIAGNOSIS — Z13.220 SCREENING FOR HYPERCHOLESTEROLEMIA: ICD-10-CM

## 2024-08-15 DIAGNOSIS — E03.8 HYPOTHYROIDISM DUE TO HASHIMOTO'S THYROIDITIS: ICD-10-CM

## 2024-08-15 DIAGNOSIS — Z13.0 SCREENING, ANEMIA, DEFICIENCY, IRON: ICD-10-CM

## 2024-08-15 DIAGNOSIS — Z13.1 SCREENING FOR DIABETES MELLITUS: ICD-10-CM

## 2024-08-15 DIAGNOSIS — E06.3 HYPOTHYROIDISM DUE TO HASHIMOTO'S THYROIDITIS: ICD-10-CM

## 2024-08-15 LAB
ALBUMIN SERPL BCG-MCNC: 4.2 G/DL (ref 3.5–5)
ALP SERPL-CCNC: 64 U/L (ref 34–104)
ALT SERPL W P-5'-P-CCNC: 14 U/L (ref 7–52)
ANION GAP SERPL CALCULATED.3IONS-SCNC: 8 MMOL/L (ref 4–13)
AST SERPL W P-5'-P-CCNC: 16 U/L (ref 13–39)
BASOPHILS # BLD AUTO: 0.07 THOUSANDS/ÂΜL (ref 0–0.1)
BASOPHILS NFR BLD AUTO: 1 % (ref 0–1)
BILIRUB SERPL-MCNC: 0.59 MG/DL (ref 0.2–1)
BUN SERPL-MCNC: 13 MG/DL (ref 5–25)
CALCIUM SERPL-MCNC: 9.3 MG/DL (ref 8.4–10.2)
CHLORIDE SERPL-SCNC: 103 MMOL/L (ref 96–108)
CHOLEST SERPL-MCNC: 191 MG/DL
CO2 SERPL-SCNC: 27 MMOL/L (ref 21–32)
CREAT SERPL-MCNC: 0.66 MG/DL (ref 0.6–1.3)
EOSINOPHIL # BLD AUTO: 0.11 THOUSAND/ÂΜL (ref 0–0.61)
EOSINOPHIL NFR BLD AUTO: 2 % (ref 0–6)
ERYTHROCYTE [DISTWIDTH] IN BLOOD BY AUTOMATED COUNT: 13.7 % (ref 11.6–15.1)
GFR SERPL CREATININE-BSD FRML MDRD: 99 ML/MIN/1.73SQ M
GLUCOSE P FAST SERPL-MCNC: 86 MG/DL (ref 65–99)
HCT VFR BLD AUTO: 40.2 % (ref 34.8–46.1)
HDLC SERPL-MCNC: 46 MG/DL
HGB BLD-MCNC: 13.1 G/DL (ref 11.5–15.4)
IMM GRANULOCYTES # BLD AUTO: 0.02 THOUSAND/UL (ref 0–0.2)
IMM GRANULOCYTES NFR BLD AUTO: 0 % (ref 0–2)
LDLC SERPL CALC-MCNC: 120 MG/DL (ref 0–100)
LYMPHOCYTES # BLD AUTO: 2.46 THOUSANDS/ÂΜL (ref 0.6–4.47)
LYMPHOCYTES NFR BLD AUTO: 43 % (ref 14–44)
MCH RBC QN AUTO: 28.4 PG (ref 26.8–34.3)
MCHC RBC AUTO-ENTMCNC: 32.6 G/DL (ref 31.4–37.4)
MCV RBC AUTO: 87 FL (ref 82–98)
MONOCYTES # BLD AUTO: 0.64 THOUSAND/ÂΜL (ref 0.17–1.22)
MONOCYTES NFR BLD AUTO: 11 % (ref 4–12)
NEUTROPHILS # BLD AUTO: 2.49 THOUSANDS/ÂΜL (ref 1.85–7.62)
NEUTS SEG NFR BLD AUTO: 43 % (ref 43–75)
NRBC BLD AUTO-RTO: 0 /100 WBCS
PLATELET # BLD AUTO: 361 THOUSANDS/UL (ref 149–390)
PMV BLD AUTO: 9.6 FL (ref 8.9–12.7)
POTASSIUM SERPL-SCNC: 4 MMOL/L (ref 3.5–5.3)
PROT SERPL-MCNC: 8 G/DL (ref 6.4–8.4)
RBC # BLD AUTO: 4.61 MILLION/UL (ref 3.81–5.12)
SODIUM SERPL-SCNC: 138 MMOL/L (ref 135–147)
TRIGL SERPL-MCNC: 127 MG/DL
TSH SERPL DL<=0.05 MIU/L-ACNC: 0.09 UIU/ML (ref 0.45–4.5)
WBC # BLD AUTO: 5.79 THOUSAND/UL (ref 4.31–10.16)

## 2024-08-15 PROCEDURE — 36415 COLL VENOUS BLD VENIPUNCTURE: CPT

## 2024-08-15 PROCEDURE — 80061 LIPID PANEL: CPT

## 2024-08-15 PROCEDURE — 80053 COMPREHEN METABOLIC PANEL: CPT

## 2024-08-15 PROCEDURE — 85025 COMPLETE CBC W/AUTO DIFF WBC: CPT

## 2024-08-15 PROCEDURE — 84443 ASSAY THYROID STIM HORMONE: CPT

## 2024-08-15 NOTE — TELEPHONE ENCOUNTER
----- Message from Danilo Robles DO sent at 8/15/2024  2:51 PM EDT -----  TSH is very suppressed and I would like to follow up and discuss this further

## 2024-10-18 ENCOUNTER — TELEPHONE (OUTPATIENT)
Age: 54
End: 2024-10-18

## 2024-10-18 NOTE — TELEPHONE ENCOUNTER
Patient's daughter states dentist will not pull tooth because her BP was a little low. They faxed a clearance form over to be reviewed by provider. Not in chart. Confirmed fax number and she will have them re-fax.

## 2024-10-22 RX ORDER — CYCLOSPORINE 0 G/ML
SOLUTION/ DROPS OPHTHALMIC; TOPICAL
COMMUNITY
Start: 2024-09-25

## 2024-10-22 RX ORDER — PERFLUOROHEXYLOCTANE 1 MG/MG
SOLUTION OPHTHALMIC
COMMUNITY
Start: 2024-09-06

## 2024-10-22 RX ORDER — AMOXICILLIN 500 MG/1
TABLET, FILM COATED ORAL
COMMUNITY
Start: 2024-10-08

## 2024-10-22 RX ORDER — TRIAMCINOLONE ACETONIDE 1 MG/G
1 CREAM TOPICAL 2 TIMES DAILY
COMMUNITY
Start: 2024-02-11 | End: 2025-02-10

## 2024-10-22 RX ORDER — IBUPROFEN 600 MG/1
600 TABLET, FILM COATED ORAL EVERY 6 HOURS PRN
COMMUNITY
Start: 2024-10-08

## 2024-10-22 NOTE — TELEPHONE ENCOUNTER
Patient's daughter called to follow up on forms. Daughter stated the forms were faxed 2 weeks ago.     I spoke with Gelnna in office. Glenna stated when Dr. Robles make a decision on wether an apt is needed or forms can be filled out that office will return a call.     Daughter expressed understanding. Daughter stated patient is in a great deal of pain.     Please advise  Thank you

## 2024-10-26 DIAGNOSIS — E06.3 HYPOTHYROIDISM DUE TO HASHIMOTO'S THYROIDITIS: ICD-10-CM

## 2024-10-27 RX ORDER — LEVOTHYROXINE SODIUM 100 MCG
TABLET ORAL
Qty: 30 TABLET | Refills: 5 | Status: SHIPPED | OUTPATIENT
Start: 2024-10-27

## 2025-01-03 ENCOUNTER — OFFICE VISIT (OUTPATIENT)
Dept: INTERNAL MEDICINE CLINIC | Facility: CLINIC | Age: 55
End: 2025-01-03
Payer: COMMERCIAL

## 2025-01-03 VITALS
BODY MASS INDEX: 27.66 KG/M2 | WEIGHT: 162 LBS | HEIGHT: 64 IN | SYSTOLIC BLOOD PRESSURE: 128 MMHG | TEMPERATURE: 97.6 F | DIASTOLIC BLOOD PRESSURE: 74 MMHG | OXYGEN SATURATION: 97 % | HEART RATE: 71 BPM

## 2025-01-03 DIAGNOSIS — E55.9 VITAMIN D DEFICIENCY: ICD-10-CM

## 2025-01-03 DIAGNOSIS — E53.8 B12 DEFICIENCY: ICD-10-CM

## 2025-01-03 DIAGNOSIS — Z23 ENCOUNTER FOR IMMUNIZATION: Primary | ICD-10-CM

## 2025-01-03 DIAGNOSIS — Z12.4 SCREENING FOR CERVICAL CANCER: ICD-10-CM

## 2025-01-03 DIAGNOSIS — I67.1 INTRACRANIAL ANEURYSM: ICD-10-CM

## 2025-01-03 DIAGNOSIS — Z12.11 ENCOUNTER FOR COLORECTAL CANCER SCREENING: ICD-10-CM

## 2025-01-03 DIAGNOSIS — Z12.12 ENCOUNTER FOR COLORECTAL CANCER SCREENING: ICD-10-CM

## 2025-01-03 DIAGNOSIS — E06.3 HYPOTHYROIDISM DUE TO HASHIMOTO'S THYROIDITIS: ICD-10-CM

## 2025-01-03 PROCEDURE — 99203 OFFICE O/P NEW LOW 30 MIN: CPT | Performed by: INTERNAL MEDICINE

## 2025-01-09 ENCOUNTER — APPOINTMENT (OUTPATIENT)
Dept: LAB | Facility: CLINIC | Age: 55
End: 2025-01-09
Payer: COMMERCIAL

## 2025-01-09 ENCOUNTER — RESULTS FOLLOW-UP (OUTPATIENT)
Dept: INTERNAL MEDICINE CLINIC | Facility: CLINIC | Age: 55
End: 2025-01-09

## 2025-01-09 DIAGNOSIS — E53.8 B12 DEFICIENCY: ICD-10-CM

## 2025-01-09 DIAGNOSIS — E55.9 VITAMIN D DEFICIENCY: ICD-10-CM

## 2025-01-09 DIAGNOSIS — E06.3 HYPOTHYROIDISM DUE TO HASHIMOTO'S THYROIDITIS: ICD-10-CM

## 2025-01-09 LAB
25(OH)D3 SERPL-MCNC: 10.7 NG/ML (ref 30–100)
TSH SERPL DL<=0.05 MIU/L-ACNC: 0.14 UIU/ML (ref 0.45–4.5)
VIT B12 SERPL-MCNC: 154 PG/ML (ref 180–914)

## 2025-01-09 PROCEDURE — 84443 ASSAY THYROID STIM HORMONE: CPT

## 2025-01-09 PROCEDURE — 82607 VITAMIN B-12: CPT

## 2025-01-09 PROCEDURE — 36415 COLL VENOUS BLD VENIPUNCTURE: CPT

## 2025-01-09 PROCEDURE — 82306 VITAMIN D 25 HYDROXY: CPT

## 2025-01-09 NOTE — RESULT ENCOUNTER NOTE
TSH is noted to be mildly suppressed and I would like to decrease the dose of Levothyroxine to 88 mcg a month and follow up with a repeat TSH in 6 weeks?  B12 noted to be decreased and I would like to start B12 shots.  Is this okay?

## 2025-01-10 DIAGNOSIS — E06.3 HYPOTHYROIDISM DUE TO HASHIMOTO'S THYROIDITIS: ICD-10-CM

## 2025-01-10 DIAGNOSIS — E53.8 B12 DEFICIENCY: Primary | ICD-10-CM

## 2025-01-10 DIAGNOSIS — R42 VERTIGO: ICD-10-CM

## 2025-01-10 RX ORDER — LEVOTHYROXINE SODIUM 88 MCG
88 TABLET ORAL DAILY
Qty: 30 TABLET | Refills: 5 | Status: SHIPPED | OUTPATIENT
Start: 2025-01-10 | End: 2025-01-10 | Stop reason: SDUPTHER

## 2025-01-10 RX ORDER — CYANOCOBALAMIN 1000 UG/ML
1000 INJECTION, SOLUTION INTRAMUSCULAR; SUBCUTANEOUS
Status: SHIPPED | OUTPATIENT
Start: 2025-01-17 | End: 2025-02-14

## 2025-01-10 NOTE — TELEPHONE ENCOUNTER
----- Message from Danilo Robles, DO sent at 1/9/2025  5:22 PM EST -----  TSH is noted to be mildly suppressed and I would like to decrease the dose of Levothyroxine to 88 mcg a month and follow up with a repeat TSH in 6 weeks?  B12 noted to be decreased and I would like to start B12 shots.  Is this okay?

## 2025-01-13 RX ORDER — LEVOTHYROXINE SODIUM 88 MCG
88 TABLET ORAL DAILY
Qty: 30 TABLET | Refills: 5 | Status: SHIPPED | OUTPATIENT
Start: 2025-01-13 | End: 2025-07-12

## 2025-01-13 RX ORDER — MECLIZINE HYDROCHLORIDE 25 MG/1
25 TABLET ORAL 3 TIMES DAILY PRN
Qty: 30 TABLET | Refills: 1 | Status: SHIPPED | OUTPATIENT
Start: 2025-01-13

## 2025-01-14 ENCOUNTER — CLINICAL SUPPORT (OUTPATIENT)
Dept: INTERNAL MEDICINE CLINIC | Facility: CLINIC | Age: 55
End: 2025-01-14
Payer: COMMERCIAL

## 2025-01-14 DIAGNOSIS — E53.8 B12 DEFICIENCY: Primary | ICD-10-CM

## 2025-01-14 DIAGNOSIS — C50.912 MALIGNANT NEOPLASM OF LEFT FEMALE BREAST, UNSPECIFIED ESTROGEN RECEPTOR STATUS, UNSPECIFIED SITE OF BREAST (HCC): Primary | ICD-10-CM

## 2025-01-14 PROCEDURE — NURSE

## 2025-01-14 PROCEDURE — 96372 THER/PROPH/DIAG INJ SC/IM: CPT | Performed by: INTERNAL MEDICINE

## 2025-01-14 RX ORDER — PERFLUOROHEXYLOCTANE 1 MG/MG
SOLUTION OPHTHALMIC
Refills: 0 | OUTPATIENT
Start: 2025-01-14

## 2025-01-14 RX ORDER — LETROZOLE 2.5 MG/1
2.5 TABLET, FILM COATED ORAL DAILY
Qty: 90 TABLET | Refills: 1 | Status: SHIPPED | COMMUNITY
Start: 2025-01-14 | End: 2025-07-13

## 2025-01-14 RX ADMIN — CYANOCOBALAMIN 1000 MCG: 1000 INJECTION, SOLUTION INTRAMUSCULAR; SUBCUTANEOUS at 09:34

## 2025-01-16 NOTE — PROGRESS NOTES
"Name: Lizeth Hartman      : 1970      MRN: [id number]  Encounter Provider: Danilo Robles DO  Encounter Date: 1/3/2025   Encounter department: [facility]  :  Assessment & Plan  Hypothyroidism due to Hashimoto's thyroiditis  TSH ordered   Continue the Levothyroxine 88 mcg QDay  Orders:  •  TSH, 3rd generation; Future    Intracranial aneurysm  Family history of cerebral aneurysm  Orders:  •  CTA head and neck w wo contrast; Future    Vitamin D deficiency  Follow up on vitamin D  Orders:  •  Vitamin D 25 hydroxy; Future    B12 deficiency  Follow up on B12  Orders:  •  Vitamin B12; Future    Screening for cervical cancer         Encounter for immunization         Encounter for colorectal cancer screening                History of Present Illness     No concerns at this time      Review of Systems   Constitutional:  Negative for chills and fever.   HENT:  Negative for ear pain and sore throat.    Eyes:  Negative for pain and visual disturbance.   Respiratory:  Negative for cough and shortness of breath.    Cardiovascular:  Negative for chest pain and palpitations.   Gastrointestinal:  Negative for abdominal pain and vomiting.   Genitourinary:  Negative for dysuria and hematuria.   Musculoskeletal:  Negative for arthralgias and back pain.   Skin:  Negative for color change and rash.   Neurological:  Negative for seizures and syncope.   All other systems reviewed and are negative.      Objective   /74   Pulse 71   Temp 97.6 °F (36.4 °C) (Tympanic)   Ht 5' 4\" (1.626 m)   Wt 73.5 kg (162 lb)   SpO2 97%   BMI 27.81 kg/m²      Physical Exam  Vitals and nursing note reviewed.   Constitutional:       General: She is not in acute distress.     Appearance: She is well-developed.   HENT:      Head: Normocephalic and atraumatic.   Eyes:      Conjunctiva/sclera: Conjunctivae normal.   Cardiovascular:      Rate and Rhythm: Normal rate and regular rhythm.      Heart sounds: No murmur " heard.  Pulmonary:      Effort: Pulmonary effort is normal. No respiratory distress.      Breath sounds: Normal breath sounds.   Abdominal:      Palpations: Abdomen is soft.      Tenderness: There is no abdominal tenderness.   Musculoskeletal:         General: No swelling.      Cervical back: Neck supple.   Skin:     General: Skin is warm and dry.      Capillary Refill: Capillary refill takes less than 2 seconds.   Neurological:      Mental Status: She is alert.   Psychiatric:         Mood and Affect: Mood normal.

## 2025-01-21 ENCOUNTER — CLINICAL SUPPORT (OUTPATIENT)
Dept: INTERNAL MEDICINE CLINIC | Facility: CLINIC | Age: 55
End: 2025-01-21
Payer: COMMERCIAL

## 2025-01-21 DIAGNOSIS — E53.8 B12 DEFICIENCY: Primary | ICD-10-CM

## 2025-01-21 PROCEDURE — NURSE

## 2025-01-21 PROCEDURE — 96372 THER/PROPH/DIAG INJ SC/IM: CPT

## 2025-01-21 RX ADMIN — CYANOCOBALAMIN 1000 MCG: 1000 INJECTION, SOLUTION INTRAMUSCULAR; SUBCUTANEOUS at 08:35

## 2025-01-28 ENCOUNTER — CLINICAL SUPPORT (OUTPATIENT)
Dept: INTERNAL MEDICINE CLINIC | Facility: CLINIC | Age: 55
End: 2025-01-28
Payer: COMMERCIAL

## 2025-01-28 DIAGNOSIS — E53.8 VITAMIN B 12 DEFICIENCY: Primary | ICD-10-CM

## 2025-01-28 PROCEDURE — NURSE

## 2025-01-28 PROCEDURE — 96372 THER/PROPH/DIAG INJ SC/IM: CPT

## 2025-01-28 RX ADMIN — CYANOCOBALAMIN 1000 MCG: 1000 INJECTION, SOLUTION INTRAMUSCULAR; SUBCUTANEOUS at 10:12

## 2025-02-04 ENCOUNTER — CLINICAL SUPPORT (OUTPATIENT)
Dept: INTERNAL MEDICINE CLINIC | Facility: CLINIC | Age: 55
End: 2025-02-04
Payer: COMMERCIAL

## 2025-02-04 DIAGNOSIS — E53.8 VITAMIN B 12 DEFICIENCY: Primary | ICD-10-CM

## 2025-02-04 PROCEDURE — 96372 THER/PROPH/DIAG INJ SC/IM: CPT

## 2025-02-04 RX ADMIN — CYANOCOBALAMIN 1000 MCG: 1000 INJECTION, SOLUTION INTRAMUSCULAR; SUBCUTANEOUS at 11:19

## 2025-04-24 ENCOUNTER — RESULTS FOLLOW-UP (OUTPATIENT)
Dept: INTERNAL MEDICINE CLINIC | Facility: CLINIC | Age: 55
End: 2025-04-24

## 2025-04-24 ENCOUNTER — APPOINTMENT (OUTPATIENT)
Dept: LAB | Facility: CLINIC | Age: 55
End: 2025-04-24
Payer: COMMERCIAL

## 2025-04-24 DIAGNOSIS — E06.3 HYPOTHYROIDISM DUE TO HASHIMOTO'S THYROIDITIS: ICD-10-CM

## 2025-04-24 LAB — TSH SERPL DL<=0.05 MIU/L-ACNC: 1.72 UIU/ML (ref 0.45–4.5)

## 2025-04-24 PROCEDURE — 36415 COLL VENOUS BLD VENIPUNCTURE: CPT

## 2025-04-24 PROCEDURE — 84443 ASSAY THYROID STIM HORMONE: CPT

## 2025-04-24 NOTE — TELEPHONE ENCOUNTER
----- Message from Danilo Robles DO sent at 4/24/2025 12:11 PM EDT -----  TSH is noted to be good and continue the current dose of Levothryoxine

## 2025-04-27 DIAGNOSIS — E06.3 HYPOTHYROIDISM DUE TO HASHIMOTO'S THYROIDITIS: ICD-10-CM

## 2025-04-27 DIAGNOSIS — C50.912 MALIGNANT NEOPLASM OF LEFT FEMALE BREAST, UNSPECIFIED ESTROGEN RECEPTOR STATUS, UNSPECIFIED SITE OF BREAST (HCC): ICD-10-CM

## 2025-04-28 RX ORDER — LEVOTHYROXINE SODIUM 88 MCG
88 TABLET ORAL DAILY
Qty: 30 TABLET | Refills: 5 | Status: SHIPPED | OUTPATIENT
Start: 2025-04-28 | End: 2025-10-25

## 2025-04-28 RX ORDER — LETROZOLE 2.5 MG/1
2.5 TABLET, FILM COATED ORAL DAILY
Qty: 90 TABLET | Refills: 0 | Status: SHIPPED | OUTPATIENT
Start: 2025-04-28 | End: 2025-10-25

## 2025-07-07 ENCOUNTER — TELEPHONE (OUTPATIENT)
Age: 55
End: 2025-07-07

## 2025-07-07 ENCOUNTER — OFFICE VISIT (OUTPATIENT)
Dept: INTERNAL MEDICINE CLINIC | Facility: CLINIC | Age: 55
End: 2025-07-07
Payer: COMMERCIAL

## 2025-07-07 VITALS
BODY MASS INDEX: 27.81 KG/M2 | DIASTOLIC BLOOD PRESSURE: 78 MMHG | HEART RATE: 70 BPM | SYSTOLIC BLOOD PRESSURE: 128 MMHG | TEMPERATURE: 98.7 F | OXYGEN SATURATION: 97 % | HEIGHT: 64 IN

## 2025-07-07 DIAGNOSIS — Z23 ENCOUNTER FOR IMMUNIZATION: ICD-10-CM

## 2025-07-07 DIAGNOSIS — Z12.11 SCREENING FOR COLORECTAL CANCER: Primary | ICD-10-CM

## 2025-07-07 DIAGNOSIS — Z12.31 ENCOUNTER FOR SCREENING MAMMOGRAM FOR BREAST CANCER: ICD-10-CM

## 2025-07-07 DIAGNOSIS — Z11.4 SCREENING FOR HIV (HUMAN IMMUNODEFICIENCY VIRUS): ICD-10-CM

## 2025-07-07 DIAGNOSIS — E06.3 HYPOTHYROIDISM DUE TO HASHIMOTO'S THYROIDITIS: ICD-10-CM

## 2025-07-07 DIAGNOSIS — B99.9 RECURRENT INFECTIONS: ICD-10-CM

## 2025-07-07 DIAGNOSIS — E53.8 B12 DEFICIENCY: ICD-10-CM

## 2025-07-07 DIAGNOSIS — Z11.59 NEED FOR HEPATITIS C SCREENING TEST: ICD-10-CM

## 2025-07-07 DIAGNOSIS — Z01.419 ENCOUNTER FOR GYNECOLOGICAL EXAMINATION: ICD-10-CM

## 2025-07-07 DIAGNOSIS — Z12.12 SCREENING FOR COLORECTAL CANCER: Primary | ICD-10-CM

## 2025-07-07 DIAGNOSIS — E66.3 OVERWEIGHT (BMI 25.0-29.9): ICD-10-CM

## 2025-07-07 PROCEDURE — 99214 OFFICE O/P EST MOD 30 MIN: CPT | Performed by: INTERNAL MEDICINE

## 2025-07-07 RX ORDER — PHENTERMINE HYDROCHLORIDE 30 MG/1
30 CAPSULE ORAL EVERY MORNING
Qty: 30 CAPSULE | Refills: 0 | Status: SHIPPED | OUTPATIENT
Start: 2025-07-07 | End: 2025-08-06

## 2025-07-07 NOTE — TELEPHONE ENCOUNTER
PA for phentermine 30 MG capsule SUBMITTED to     via    [x]CMM-KEY: [id number]  []Surescripts-Case ID #   []Availity-Auth ID # NDC [id number]   []Faxed to plan   []Other website   []Phone call Case ID #     [x]PA sent as URGENT    All office notes, labs and other pertaining documents and studies sent. Clinical questions answered. Awaiting determination from insurance company.     Turnaround time for your insurance to make a decision on your Prior Authorization can take 7-21 business days.

## 2025-07-07 NOTE — PROGRESS NOTES
Name: Lizeth Hartman      : 1970      MRN: [id number]  Encounter Provider: Danilo Robles DO  Encounter Date: 2025   Encounter department: [facility]  :  Assessment & Plan  Need for hepatitis C screening test  Deferred       Screening for HIV (human immunodeficiency virus)  Deferred       Encounter for gynecological examination  Deferred       Encounter for screening mammogram for breast cancer  Accepted and order placed  Orders:  •  Mammo screening bilateral w 3d and cad; Future    Encounter for immunization  Deferred all immunizations at this time       Screening for colorectal cancer  Colon cancer screening deferred       Overweight (BMI 25.0-29.9)  The patient is interested in weight loss and is over weight  BP good and no BP meds at this time  Trial of Phentermine 30 mg Qday  Orders:  •  phentermine 30 MG capsule; Take 1 capsule (30 mg total) by mouth every morning  •  Ambulatory Referral to Weight Management; Future    B12 deficiency  Follow up on B12 and MMA  If (+) we will restart the B12 stops  Orders:  •  Vitamin B12; Future  •  Methylmalonic acid, serum; Future    Recurrent infections  Periodontitis and the dentist requested evaluation  Orders:  •  IgG, IgA, IgM; Future    Hypothyroidism due to Hashimoto's thyroiditis  Continue the Levothyroxine 88 mcg Qday  Follow up on TSH  Orders:  •  TSH, 3rd generation; Future      Assessment & Plan           History of Present Illness   Interested in weight loss and discussed medical weight management.  She is also interested in Phentermine.  The patient is interested in restart of B12 shots.  The patient is interested in rechecking her TSH.  No other concerns       Review of Systems   Constitutional:  Negative for chills, fatigue and fever.   HENT: Negative.     Respiratory:  Negative for cough, chest tightness and shortness of breath.    Cardiovascular:  Negative for chest pain, palpitations and leg swelling.   Gastrointestinal:   "Negative for abdominal pain, constipation, diarrhea, nausea and vomiting.   Genitourinary: Negative.    Musculoskeletal:  Negative for arthralgias, back pain and myalgias.   Skin: Negative.    Neurological: Negative.    Psychiatric/Behavioral: Negative.         Objective   /78 (BP Location: Right arm, Patient Position: Sitting, Cuff Size: Large)   Pulse 70   Temp 98.7 °F (37.1 °C)   Ht 5' 4\" (1.626 m)   SpO2 97%   BMI 27.81 kg/m²      Physical Exam  Vitals and nursing note reviewed.   Constitutional:       General: She is not in acute distress.     Appearance: She is well-developed.   HENT:      Head: Normocephalic and atraumatic.     Eyes:      Conjunctiva/sclera: Conjunctivae normal.       Cardiovascular:      Rate and Rhythm: Normal rate and regular rhythm.      Heart sounds: No murmur heard.  Pulmonary:      Effort: Pulmonary effort is normal. No respiratory distress.      Breath sounds: Normal breath sounds.   Abdominal:      Palpations: Abdomen is soft.      Tenderness: There is no abdominal tenderness.     Musculoskeletal:         General: No swelling.      Cervical back: Neck supple.     Skin:     General: Skin is warm and dry.      Capillary Refill: Capillary refill takes less than 2 seconds.     Neurological:      Mental Status: She is alert.     Psychiatric:         Mood and Affect: Mood normal.         "

## 2025-07-07 NOTE — ASSESSMENT & PLAN NOTE
Continue the Levothyroxine 88 mcg Qday  Follow up on TSH  Orders:  •  TSH, 3rd generation; Future  
no

## 2025-07-08 ENCOUNTER — APPOINTMENT (OUTPATIENT)
Dept: LAB | Facility: CLINIC | Age: 55
End: 2025-07-08
Payer: COMMERCIAL

## 2025-07-08 DIAGNOSIS — E06.3 HYPOTHYROIDISM DUE TO HASHIMOTO'S THYROIDITIS: ICD-10-CM

## 2025-07-08 DIAGNOSIS — E53.8 B12 DEFICIENCY: ICD-10-CM

## 2025-07-08 DIAGNOSIS — B99.9 RECURRENT INFECTIONS: ICD-10-CM

## 2025-07-08 LAB
IGA SERPL-MCNC: 424 MG/DL (ref 66–433)
IGG SERPL-MCNC: 1382 MG/DL (ref 635–1741)
IGM SERPL-MCNC: 107 MG/DL (ref 45–281)
TSH SERPL DL<=0.05 MIU/L-ACNC: 0.64 UIU/ML (ref 0.45–4.5)
VIT B12 SERPL-MCNC: 190 PG/ML (ref 180–914)

## 2025-07-08 PROCEDURE — 84443 ASSAY THYROID STIM HORMONE: CPT

## 2025-07-08 PROCEDURE — 36415 COLL VENOUS BLD VENIPUNCTURE: CPT

## 2025-07-08 PROCEDURE — 83918 ORGANIC ACIDS TOTAL QUANT: CPT

## 2025-07-08 PROCEDURE — 82784 ASSAY IGA/IGD/IGG/IGM EACH: CPT

## 2025-07-08 PROCEDURE — 82607 VITAMIN B-12: CPT

## 2025-07-08 NOTE — TELEPHONE ENCOUNTER
PA for phentermine 30 MG capsule  DENIED    Reason:(Screenshot if applicable)        Message sent to office clinical pool Yes    Denial letter scanned into Media Yes    We can gladly do an appeal but the process can take about 30-60 days to provide determination. Please have the office staff schedule a Peer to Peer at phone [phone] . If an appeal is truly warranted please have Provider send clinical documentation to the PA department to support the appeal.     **Please follow up with your patient regarding denial and next steps**

## 2025-07-09 NOTE — TELEPHONE ENCOUNTER
Notify the patient the medication is not covered.  She can try Medical Weight Management or follow up with the exercise [employer] program or both.

## 2025-07-11 LAB — METHYLMALONATE SERPL-SCNC: 289 NMOL/L (ref 0–378)

## 2025-07-16 ENCOUNTER — RESULTS FOLLOW-UP (OUTPATIENT)
Dept: INTERNAL MEDICINE CLINIC | Facility: CLINIC | Age: 55
End: 2025-07-16

## 2025-08-01 ENCOUNTER — HOSPITAL ENCOUNTER (OUTPATIENT)
Dept: MAMMOGRAPHY | Facility: HOSPITAL | Age: 55
Discharge: HOME | End: 2025-08-01
Payer: COMMERCIAL

## 2025-08-01 DIAGNOSIS — Z12.31 ENCOUNTER FOR SCREENING MAMMOGRAM FOR BREAST CANCER: ICD-10-CM

## 2025-08-01 PROCEDURE — 77067 SCR MAMMO BI INCL CAD: CPT

## 2025-08-01 PROCEDURE — 77063 BREAST TOMOSYNTHESIS BI: CPT

## 2025-08-06 DIAGNOSIS — E06.3 HYPOTHYROIDISM DUE TO HASHIMOTO'S THYROIDITIS: ICD-10-CM

## 2025-08-06 DIAGNOSIS — E66.3 OVERWEIGHT (BMI 25.0-29.9): ICD-10-CM

## 2025-08-06 RX ORDER — LEVOTHYROXINE SODIUM 88 MCG
88 TABLET ORAL DAILY
Qty: 30 TABLET | Refills: 5 | Status: SHIPPED | OUTPATIENT
Start: 2025-08-06 | End: 2026-02-02

## 2025-08-07 RX ORDER — PHENTERMINE HYDROCHLORIDE 30 MG/1
30 CAPSULE ORAL EVERY MORNING
Qty: 30 CAPSULE | Refills: 0 | Status: SHIPPED | OUTPATIENT
Start: 2025-08-07 | End: 2025-09-06

## 2025-08-18 ENCOUNTER — TELEPHONE (OUTPATIENT)
Dept: INTERNAL MEDICINE CLINIC | Facility: CLINIC | Age: 55
End: 2025-08-18